# Patient Record
Sex: FEMALE | Race: WHITE | Employment: FULL TIME | URBAN - NONMETROPOLITAN AREA
[De-identification: names, ages, dates, MRNs, and addresses within clinical notes are randomized per-mention and may not be internally consistent; named-entity substitution may affect disease eponyms.]

---

## 2017-03-09 ENCOUNTER — TELEPHONE (OUTPATIENT)
Dept: FAMILY MEDICINE CLINIC | Age: 27
End: 2017-03-09

## 2017-03-09 DIAGNOSIS — L30.9 ECZEMA, UNSPECIFIED TYPE: Primary | ICD-10-CM

## 2017-03-09 RX ORDER — METHYLPREDNISOLONE 4 MG/1
TABLET ORAL
Qty: 1 KIT | Refills: 0 | Status: SHIPPED | OUTPATIENT
Start: 2017-03-09 | End: 2017-03-15

## 2017-04-21 ENCOUNTER — OFFICE VISIT (OUTPATIENT)
Dept: PSYCHIATRY | Age: 27
End: 2017-04-21

## 2017-04-21 VITALS
SYSTOLIC BLOOD PRESSURE: 149 MMHG | WEIGHT: 161 LBS | BODY MASS INDEX: 24.4 KG/M2 | HEIGHT: 68 IN | DIASTOLIC BLOOD PRESSURE: 89 MMHG | HEART RATE: 67 BPM

## 2017-04-21 DIAGNOSIS — F33.42 RECURRENT MAJOR DEPRESSIVE DISORDER, IN FULL REMISSION (HCC): Primary | ICD-10-CM

## 2017-04-21 DIAGNOSIS — F90.0 ATTENTION DEFICIT HYPERACTIVITY DISORDER (ADHD), PREDOMINANTLY INATTENTIVE TYPE: ICD-10-CM

## 2017-04-21 PROCEDURE — 90792 PSYCH DIAG EVAL W/MED SRVCS: CPT | Performed by: NURSE PRACTITIONER

## 2017-04-21 ASSESSMENT — ANXIETY QUESTIONNAIRES
1. FEELING NERVOUS, ANXIOUS, OR ON EDGE: 0-NOT AT ALL SURE
6. BECOMING EASILY ANNOYED OR IRRITABLE: 0-NOT AT ALL SURE
GAD7 TOTAL SCORE: 1
4. TROUBLE RELAXING: 0-NOT AT ALL SURE
2. NOT BEING ABLE TO STOP OR CONTROL WORRYING: 0-NOT AT ALL SURE
5. BEING SO RESTLESS THAT IT IS HARD TO SIT STILL: 1-SEVERAL DAYS
3. WORRYING TOO MUCH ABOUT DIFFERENT THINGS: 0-NOT AT ALL SURE
7. FEELING AFRAID AS IF SOMETHING AWFUL MIGHT HAPPEN: 0-NOT AT ALL SURE

## 2017-04-21 ASSESSMENT — PATIENT HEALTH QUESTIONNAIRE - PHQ9
10. IF YOU CHECKED OFF ANY PROBLEMS, HOW DIFFICULT HAVE THESE PROBLEMS MADE IT FOR YOU TO DO YOUR WORK, TAKE CARE OF THINGS AT HOME, OR GET ALONG WITH OTHER PEOPLE: 0
SUM OF ALL RESPONSES TO PHQ QUESTIONS 1-9: 0
7. TROUBLE CONCENTRATING ON THINGS, SUCH AS READING THE NEWSPAPER OR WATCHING TELEVISION: 0
5. POOR APPETITE OR OVEREATING: 0
2. FEELING DOWN, DEPRESSED OR HOPELESS: 0
8. MOVING OR SPEAKING SO SLOWLY THAT OTHER PEOPLE COULD HAVE NOTICED. OR THE OPPOSITE, BEING SO FIGETY OR RESTLESS THAT YOU HAVE BEEN MOVING AROUND A LOT MORE THAN USUAL: 0
6. FEELING BAD ABOUT YOURSELF - OR THAT YOU ARE A FAILURE OR HAVE LET YOURSELF OR YOUR FAMILY DOWN: 0
9. THOUGHTS THAT YOU WOULD BE BETTER OFF DEAD, OR OF HURTING YOURSELF: 0
SUM OF ALL RESPONSES TO PHQ9 QUESTIONS 1 & 2: 0
3. TROUBLE FALLING OR STAYING ASLEEP: 0
1. LITTLE INTEREST OR PLEASURE IN DOING THINGS: 0
4. FEELING TIRED OR HAVING LITTLE ENERGY: 0

## 2017-09-08 ENCOUNTER — OFFICE VISIT (OUTPATIENT)
Dept: FAMILY MEDICINE CLINIC | Age: 27
End: 2017-09-08

## 2017-09-08 VITALS
HEIGHT: 69 IN | BODY MASS INDEX: 23.25 KG/M2 | HEART RATE: 68 BPM | TEMPERATURE: 98.8 F | DIASTOLIC BLOOD PRESSURE: 68 MMHG | WEIGHT: 157 LBS | SYSTOLIC BLOOD PRESSURE: 132 MMHG | RESPIRATION RATE: 12 BRPM

## 2017-09-08 DIAGNOSIS — J30.1 SEASONAL ALLERGIC RHINITIS DUE TO POLLEN: ICD-10-CM

## 2017-09-08 DIAGNOSIS — F90.0 ATTENTION DEFICIT HYPERACTIVITY DISORDER (ADHD), PREDOMINANTLY INATTENTIVE TYPE: ICD-10-CM

## 2017-09-08 DIAGNOSIS — F41.1 GENERALIZED ANXIETY DISORDER: ICD-10-CM

## 2017-09-08 DIAGNOSIS — F33.42 RECURRENT MAJOR DEPRESSIVE DISORDER, IN FULL REMISSION (HCC): Primary | ICD-10-CM

## 2017-09-08 PROCEDURE — 99214 OFFICE O/P EST MOD 30 MIN: CPT | Performed by: NURSE PRACTITIONER

## 2017-09-08 RX ORDER — METHYLPHENIDATE HYDROCHLORIDE 36 MG/1
36 TABLET ORAL DAILY
Qty: 30 TABLET | Refills: 0 | Status: SHIPPED | OUTPATIENT
Start: 2017-09-08 | End: 2018-02-05 | Stop reason: ALTCHOICE

## 2017-09-08 RX ORDER — METHYLPHENIDATE HYDROCHLORIDE 36 MG/1
36 TABLET ORAL EVERY MORNING
Qty: 30 TABLET | Refills: 0 | Status: SHIPPED | OUTPATIENT
Start: 2017-09-08 | End: 2017-11-28 | Stop reason: SDUPTHER

## 2017-09-08 RX ORDER — FLUOXETINE HYDROCHLORIDE 40 MG/1
40 CAPSULE ORAL DAILY
Qty: 90 CAPSULE | Refills: 3 | Status: SHIPPED | OUTPATIENT
Start: 2017-09-08 | End: 2018-11-15 | Stop reason: SDUPTHER

## 2017-09-08 RX ORDER — FLUTICASONE PROPIONATE 50 MCG
2 SPRAY, SUSPENSION (ML) NASAL DAILY
Qty: 1 BOTTLE | Refills: 5 | Status: SHIPPED | OUTPATIENT
Start: 2017-09-08 | End: 2019-02-22 | Stop reason: ALTCHOICE

## 2017-09-15 ENCOUNTER — TELEPHONE (OUTPATIENT)
Dept: FAMILY MEDICINE CLINIC | Age: 27
End: 2017-09-15

## 2017-09-15 DIAGNOSIS — Z29.8 NEED FOR MALARIA PROPHYLAXIS: Primary | ICD-10-CM

## 2017-09-15 RX ORDER — ATOVAQUONE AND PROGUANIL HYDROCHLORIDE 250; 100 MG/1; MG/1
1 TABLET, FILM COATED ORAL DAILY
Qty: 16 TABLET | Refills: 0 | Status: SHIPPED | OUTPATIENT
Start: 2017-09-15 | End: 2018-01-02 | Stop reason: ALTCHOICE

## 2017-09-28 ENCOUNTER — NURSE ONLY (OUTPATIENT)
Dept: FAMILY MEDICINE CLINIC | Age: 27
End: 2017-09-28

## 2017-09-28 DIAGNOSIS — Z23 NEED FOR IMMUNIZATION AGAINST TYPHOID: Primary | ICD-10-CM

## 2017-09-28 PROCEDURE — 90471 IMMUNIZATION ADMIN: CPT | Performed by: NURSE PRACTITIONER

## 2017-09-28 PROCEDURE — 90691 TYPHOID VACCINE IM: CPT | Performed by: NURSE PRACTITIONER

## 2017-10-10 DIAGNOSIS — F90.0 ATTENTION DEFICIT HYPERACTIVITY DISORDER (ADHD), PREDOMINANTLY INATTENTIVE TYPE: Primary | ICD-10-CM

## 2017-10-10 RX ORDER — METHYLPHENIDATE HYDROCHLORIDE 36 MG/1
TABLET ORAL
Qty: 30 TABLET | Refills: 0 | Status: SHIPPED | OUTPATIENT
Start: 2017-10-10 | End: 2018-02-05 | Stop reason: ALTCHOICE

## 2017-10-10 NOTE — TELEPHONE ENCOUNTER
ASSESSMENT & PLAN  1. Attention deficit hyperactivity disorder (ADHD), predominantly inattentive type    - methylphenidate (CONCERTA) 36 MG extended release tablet; TAKE ONE TABLET BY MOUTH EVERY MORNING. Dispense: 30 tablet; Refill: 0      OAARS reviewed and appropriate. Controlled Substances Monitoring: Attestation: The Prescription Monitoring Report for this patient was reviewed today. Manish Ochoa DO)  Documentation: No signs of potential drug abuse or diversion identified. Manish Ochoa, DO)      No future appointments.

## 2017-11-28 DIAGNOSIS — F90.0 ATTENTION DEFICIT HYPERACTIVITY DISORDER (ADHD), PREDOMINANTLY INATTENTIVE TYPE: ICD-10-CM

## 2017-11-28 RX ORDER — METHYLPHENIDATE HYDROCHLORIDE 36 MG/1
36 TABLET ORAL EVERY MORNING
Qty: 30 TABLET | Refills: 0 | Status: SHIPPED | OUTPATIENT
Start: 2017-11-28 | End: 2018-01-12 | Stop reason: SDUPTHER

## 2018-01-02 ENCOUNTER — OFFICE VISIT (OUTPATIENT)
Dept: FAMILY MEDICINE CLINIC | Age: 28
End: 2018-01-02
Payer: COMMERCIAL

## 2018-01-02 VITALS
DIASTOLIC BLOOD PRESSURE: 64 MMHG | TEMPERATURE: 98.8 F | RESPIRATION RATE: 12 BRPM | HEIGHT: 69 IN | BODY MASS INDEX: 23.25 KG/M2 | WEIGHT: 157 LBS | HEART RATE: 80 BPM | SYSTOLIC BLOOD PRESSURE: 124 MMHG

## 2018-01-02 DIAGNOSIS — S29.012A STRAIN OF RHOMBOID MUSCLE, INITIAL ENCOUNTER: Primary | ICD-10-CM

## 2018-01-02 PROCEDURE — 99213 OFFICE O/P EST LOW 20 MIN: CPT | Performed by: NURSE PRACTITIONER

## 2018-01-02 RX ORDER — METHYLPREDNISOLONE 4 MG/1
TABLET ORAL
Qty: 1 KIT | Refills: 0 | Status: SHIPPED | OUTPATIENT
Start: 2018-01-02 | End: 2018-01-08

## 2018-01-02 NOTE — PROGRESS NOTES
SUBJECTIVE:  Isis Miles is a 33 y.o. y/o female that presents with Back Pain (upper - started sat)    HPI:  Symptoms have been present for 2 day(s). she describes the pain as sharp, burning  in the cervical, thoracic region. Inciting injury or history of trauma? Yes - was doing shoulder press  Pain is relieved by - ibuprofen, ice  Pain is aggravated by - turning, certain positions  Radiation of the pain? Yes, up her neck and down the left side of her back  Paresthesias of the extremities? No  Saddle anesthesia? No  Bowel or bladder incontinence? No  Treatments tried - ibuprofen 800 mg, zanaflex, currently does feel like the pain is getting slowly better. The Zanaflex doesn't help, just makes her very tired, and it also made her nauseated. OBJECTIVE:  /64   Pulse 80   Temp 98.8 °F (37.1 °C) (Oral)   Resp 12   Ht 5' 9\" (1.753 m)   Wt 157 lb (71.2 kg)   BMI 23.18 kg/m²   Physical Examination: General appearance - alert, well appearing, and in no distress  Chest - clear to auscultation, no wheezes, rales or rhonchi, symmetric air entry  Heart - normal rate, regular rhythm, normal S1, S2, no murmurs, rubs, clicks or gallops  Back exam - limited range of motion, pain with motion noted during exam, tenderness noted left cervical paraspinous muscles, + cervical muscle spasm, pain and mild swelling left rhomboid muscle with + spasm, sensory exam intact bilateral lower extremities,  5/5 strength globally and symmetrically in the LEs  Extremities - peripheral pulses normal, no pedal edema, no clubbing or cyanosis  Skin -  Skin color, texture, turgor normal. No rashes or lesions. Psych - Affect normal, no evidence of depression or anxiety    ASSESSMENT & Nbaadonis Huynh was seen today for back pain. Diagnoses and all orders for this visit:    Strain of rhomboid muscle, initial encounter  -     methylPREDNISolone (MEDROL DOSEPACK) 4 MG tablet; Take by mouth as directed.     continue Motrin and

## 2018-01-12 DIAGNOSIS — F90.0 ATTENTION DEFICIT HYPERACTIVITY DISORDER (ADHD), PREDOMINANTLY INATTENTIVE TYPE: ICD-10-CM

## 2018-01-12 RX ORDER — METHYLPHENIDATE HYDROCHLORIDE 36 MG/1
TABLET ORAL
Qty: 30 TABLET | Refills: 0 | Status: SHIPPED | OUTPATIENT
Start: 2018-01-12 | End: 2018-02-05 | Stop reason: ALTCHOICE

## 2018-02-05 ENCOUNTER — TELEPHONE (OUTPATIENT)
Dept: FAMILY MEDICINE CLINIC | Age: 28
End: 2018-02-05

## 2018-02-05 DIAGNOSIS — F90.0 ATTENTION DEFICIT HYPERACTIVITY DISORDER (ADHD), PREDOMINANTLY INATTENTIVE TYPE: Primary | ICD-10-CM

## 2018-02-05 RX ORDER — METHYLPHENIDATE HYDROCHLORIDE 54 MG/1
54 TABLET ORAL EVERY MORNING
Qty: 30 TABLET | Refills: 0 | Status: SHIPPED | OUTPATIENT
Start: 2018-02-05 | End: 2018-03-05 | Stop reason: SDUPTHER

## 2018-02-05 NOTE — TELEPHONE ENCOUNTER
Started PA with Mini from [employer] for Concerta 54 mg daily. Mini states we should receive a fax for Approval/Denial in 24 hrs. If we have not heard back from them by 4:30 tomorrow 2/6/18, we should call the [employer] back to ask for verdict. For the PA pt has previously tried and failed Adderal ER, Ritalin ER and has been on Concerta 36 mg since age 13 yr.

## 2018-02-26 ENCOUNTER — TELEPHONE (OUTPATIENT)
Dept: FAMILY MEDICINE CLINIC | Age: 28
End: 2018-02-26

## 2018-02-26 DIAGNOSIS — J40 BRONCHITIS: Primary | ICD-10-CM

## 2018-02-26 RX ORDER — ALBUTEROL SULFATE 90 UG/1
2 AEROSOL, METERED RESPIRATORY (INHALATION) EVERY 6 HOURS PRN
Qty: 1 INHALER | Refills: 3 | Status: SHIPPED | OUTPATIENT
Start: 2018-02-26

## 2018-02-26 RX ORDER — BENZONATATE 100 MG/1
100 CAPSULE ORAL 3 TIMES DAILY PRN
Qty: 30 CAPSULE | Refills: 0 | Status: SHIPPED | OUTPATIENT
Start: 2018-02-26 | End: 2018-03-05

## 2018-03-05 DIAGNOSIS — F90.0 ATTENTION DEFICIT HYPERACTIVITY DISORDER (ADHD), PREDOMINANTLY INATTENTIVE TYPE: ICD-10-CM

## 2018-03-05 RX ORDER — METHYLPHENIDATE HYDROCHLORIDE 54 MG/1
TABLET ORAL
Qty: 30 TABLET | Refills: 0 | Status: SHIPPED | OUTPATIENT
Start: 2018-03-05 | End: 2018-03-08 | Stop reason: SDUPTHER

## 2018-03-08 ENCOUNTER — OFFICE VISIT (OUTPATIENT)
Dept: FAMILY MEDICINE CLINIC | Age: 28
End: 2018-03-08
Payer: COMMERCIAL

## 2018-03-08 VITALS
HEART RATE: 58 BPM | DIASTOLIC BLOOD PRESSURE: 70 MMHG | TEMPERATURE: 98.3 F | WEIGHT: 165 LBS | BODY MASS INDEX: 25.01 KG/M2 | HEIGHT: 68 IN | SYSTOLIC BLOOD PRESSURE: 94 MMHG | RESPIRATION RATE: 14 BRPM

## 2018-03-08 DIAGNOSIS — F90.0 ATTENTION DEFICIT HYPERACTIVITY DISORDER (ADHD), PREDOMINANTLY INATTENTIVE TYPE: ICD-10-CM

## 2018-03-08 DIAGNOSIS — D50.8 IRON DEFICIENCY ANEMIA DUE TO DIETARY CAUSES: Primary | ICD-10-CM

## 2018-03-08 PROCEDURE — 99213 OFFICE O/P EST LOW 20 MIN: CPT | Performed by: NURSE PRACTITIONER

## 2018-03-08 RX ORDER — METHYLPHENIDATE HYDROCHLORIDE 54 MG/1
TABLET ORAL
Qty: 30 TABLET | Refills: 0 | Status: SHIPPED | OUTPATIENT
Start: 2018-03-08 | End: 2018-06-25 | Stop reason: SDUPTHER

## 2018-03-16 ENCOUNTER — TELEPHONE (OUTPATIENT)
Dept: FAMILY MEDICINE CLINIC | Age: 28
End: 2018-03-16

## 2018-03-16 NOTE — TELEPHONE ENCOUNTER
1st attempt to contact the pt re:overdue labs that [facility] ordered on 3/8/18. Pt states she will get them done.

## 2018-04-07 ENCOUNTER — HOSPITAL ENCOUNTER (OUTPATIENT)
Age: 28
Discharge: HOME | End: 2018-04-07
Payer: COMMERCIAL

## 2018-04-07 LAB
BASOPHILS # BLD: 1.3 %
BASOPHILS ABSOLUTE: 0.1 THOU/MM3 (ref 0–0.1)
EOSINOPHIL # BLD: 2.7 %
EOSINOPHILS ABSOLUTE: 0.1 THOU/MM3 (ref 0–0.4)
FERRITIN: 46 NG/ML (ref 10–291)
HCT VFR BLD CALC: 40.4 % (ref 37–47)
HEMOGLOBIN: 13.5 GM/DL (ref 12–16)
IRON SATURATION: 23 % (ref 20–50)
IRON: 74 UG/DL (ref 50–170)
LYMPHOCYTES # BLD: 33.6 %
LYMPHOCYTES ABSOLUTE: 1.4 THOU/MM3 (ref 1–4.8)
MCH RBC QN AUTO: 32.2 PG (ref 27–31)
MCHC RBC AUTO-ENTMCNC: 33.4 GM/DL (ref 33–37)
MCV RBC AUTO: 96.5 FL (ref 81–99)
MONOCYTES # BLD: 7.9 %
MONOCYTES ABSOLUTE: 0.3 THOU/MM3 (ref 0.4–1.3)
NUCLEATED RED BLOOD CELLS: 0 /100 WBC
PDW BLD-RTO: 12.3 % (ref 11.5–14.5)
PLATELET # BLD: 193 THOU/MM3 (ref 130–400)
PMV BLD AUTO: 12 FL (ref 7.4–10.4)
RBC # BLD: 4.18 MILL/MM3 (ref 4.2–5.4)
SEG NEUTROPHILS: 54.5 %
SEGMENTED NEUTROPHILS ABSOLUTE COUNT: 2.3 THOU/MM3 (ref 1.8–7.7)
TOTAL IRON BINDING CAPACITY: 328 UG/DL (ref 171–450)
WBC # BLD: 4.3 THOU/MM3 (ref 4.8–10.8)

## 2018-04-07 PROCEDURE — 36415 COLL VENOUS BLD VENIPUNCTURE: CPT

## 2018-04-07 PROCEDURE — 82728 ASSAY OF FERRITIN: CPT

## 2018-04-07 PROCEDURE — 83540 ASSAY OF IRON: CPT

## 2018-04-07 PROCEDURE — 85025 COMPLETE CBC W/AUTO DIFF WBC: CPT

## 2018-04-07 PROCEDURE — 83550 IRON BINDING TEST: CPT

## 2018-04-17 DIAGNOSIS — F90.0 ATTENTION DEFICIT HYPERACTIVITY DISORDER (ADHD), PREDOMINANTLY INATTENTIVE TYPE: ICD-10-CM

## 2018-04-17 RX ORDER — METHYLPHENIDATE HYDROCHLORIDE 54 MG/1
TABLET ORAL
Qty: 30 TABLET | Refills: 0 | Status: SHIPPED | OUTPATIENT
Start: 2018-04-17 | End: 2018-10-23 | Stop reason: SDUPTHER

## 2018-05-21 ENCOUNTER — TELEPHONE (OUTPATIENT)
Dept: FAMILY MEDICINE CLINIC | Age: 28
End: 2018-05-21

## 2018-05-21 ENCOUNTER — NURSE ONLY (OUTPATIENT)
Dept: FAMILY MEDICINE CLINIC | Age: 28
End: 2018-05-21
Payer: COMMERCIAL

## 2018-05-21 DIAGNOSIS — Z23 NEED FOR HEPATITIS A IMMUNIZATION: Primary | ICD-10-CM

## 2018-05-21 PROCEDURE — 90471 IMMUNIZATION ADMIN: CPT | Performed by: NURSE PRACTITIONER

## 2018-05-21 PROCEDURE — 90633 HEPA VACC PED/ADOL 2 DOSE IM: CPT | Performed by: NURSE PRACTITIONER

## 2018-06-25 DIAGNOSIS — F90.0 ATTENTION DEFICIT HYPERACTIVITY DISORDER (ADHD), PREDOMINANTLY INATTENTIVE TYPE: ICD-10-CM

## 2018-06-25 RX ORDER — METHYLPHENIDATE HYDROCHLORIDE 54 MG/1
TABLET ORAL
Qty: 30 TABLET | Refills: 0 | Status: SHIPPED | OUTPATIENT
Start: 2018-06-25 | End: 2018-07-24 | Stop reason: SDUPTHER

## 2018-07-05 ENCOUNTER — TELEPHONE (OUTPATIENT)
Dept: FAMILY MEDICINE CLINIC | Age: 28
End: 2018-07-05

## 2018-07-05 DIAGNOSIS — Z29.8 NEED FOR MALARIA PROPHYLAXIS: ICD-10-CM

## 2018-07-05 RX ORDER — ATOVAQUONE AND PROGUANIL HYDROCHLORIDE 250; 100 MG/1; MG/1
1 TABLET, FILM COATED ORAL DAILY
Qty: 16 TABLET | Refills: 0 | Status: SHIPPED | OUTPATIENT
Start: 2018-07-05 | End: 2018-09-24 | Stop reason: SDUPTHER

## 2018-07-05 NOTE — TELEPHONE ENCOUNTER
Pt going on trip to [facility] next week. Requesting script for Malaria medication (Malarone) sent to [facility].

## 2018-07-24 DIAGNOSIS — F90.0 ATTENTION DEFICIT HYPERACTIVITY DISORDER (ADHD), PREDOMINANTLY INATTENTIVE TYPE: ICD-10-CM

## 2018-07-24 RX ORDER — METHYLPHENIDATE HYDROCHLORIDE 54 MG/1
TABLET ORAL
Qty: 30 TABLET | Refills: 0 | Status: SHIPPED | OUTPATIENT
Start: 2018-07-24 | End: 2018-08-23 | Stop reason: SDUPTHER

## 2018-08-23 ENCOUNTER — OFFICE VISIT (OUTPATIENT)
Dept: FAMILY MEDICINE CLINIC | Age: 28
End: 2018-08-23
Payer: COMMERCIAL

## 2018-08-23 VITALS
HEIGHT: 70 IN | BODY MASS INDEX: 24.2 KG/M2 | TEMPERATURE: 98.3 F | RESPIRATION RATE: 16 BRPM | HEART RATE: 72 BPM | DIASTOLIC BLOOD PRESSURE: 78 MMHG | WEIGHT: 169 LBS | SYSTOLIC BLOOD PRESSURE: 138 MMHG

## 2018-08-23 DIAGNOSIS — F41.1 GENERALIZED ANXIETY DISORDER: Primary | ICD-10-CM

## 2018-08-23 DIAGNOSIS — F90.0 ATTENTION DEFICIT HYPERACTIVITY DISORDER (ADHD), PREDOMINANTLY INATTENTIVE TYPE: ICD-10-CM

## 2018-08-23 PROCEDURE — 99214 OFFICE O/P EST MOD 30 MIN: CPT | Performed by: NURSE PRACTITIONER

## 2018-08-23 RX ORDER — BUSPIRONE HYDROCHLORIDE 10 MG/1
10 TABLET ORAL 3 TIMES DAILY
Qty: 90 TABLET | Refills: 2 | Status: SHIPPED | OUTPATIENT
Start: 2018-08-23

## 2018-08-23 RX ORDER — METHYLPHENIDATE HYDROCHLORIDE 54 MG/1
TABLET ORAL
Qty: 30 TABLET | Refills: 0 | Status: SHIPPED | OUTPATIENT
Start: 2018-08-23 | End: 2018-12-12 | Stop reason: SDUPTHER

## 2018-08-23 NOTE — PROGRESS NOTES
Have you changed or started any medications since your last visit including any over-the-counter medicines, vitamins, or herbal medicines? no   Are you having any side effects from any of your medications? -  no  Have you stopped taking any of your medications? Is so, why? -  no    Have you seen any other physician or provider since your last visit? No  Have you had any other diagnostic tests since your last visit? No  Have you been seen in the emergency room and/or had an admission to a hospital since we last saw you? No  Have you had your routine dental cleaning in the past 6 months? no    Have you activated your [employer] account? If not, what are your barriers? Yes     Patient Care Team:  SAMUEL Lala CNP as PCP - General (Family Medicine)  SAMUEL Lala CNP as PCP - S Attributed Provider  MD Marcus Richmond MD Johnye Captain, MD    Medical History Review  Past Medical, Family, and Social History     Defer to provider.

## 2018-09-18 ENCOUNTER — OFFICE VISIT (OUTPATIENT)
Dept: FAMILY MEDICINE CLINIC | Age: 28
End: 2018-09-18

## 2018-09-18 VITALS
DIASTOLIC BLOOD PRESSURE: 78 MMHG | HEIGHT: 70 IN | RESPIRATION RATE: 16 BRPM | BODY MASS INDEX: 23.54 KG/M2 | TEMPERATURE: 98.8 F | SYSTOLIC BLOOD PRESSURE: 132 MMHG | HEART RATE: 100 BPM | WEIGHT: 164.4 LBS

## 2018-09-18 DIAGNOSIS — S46.912A STRAIN OF LEFT SHOULDER, INITIAL ENCOUNTER: ICD-10-CM

## 2018-09-18 DIAGNOSIS — S13.4XXA WHIPLASH INJURY TO NECK, INITIAL ENCOUNTER: Primary | ICD-10-CM

## 2018-09-18 DIAGNOSIS — T14.8XXA CONTUSION OF LEFT CLAVICLE, INITIAL ENCOUNTER: ICD-10-CM
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
## 2018-09-18 PROCEDURE — 99214 OFFICE O/P EST MOD 30 MIN: CPT | Performed by: NURSE PRACTITIONER

## 2018-09-18 RX ORDER — METHYLPREDNISOLONE 4 MG/1
TABLET ORAL
Qty: 1 KIT | Refills: 0 | Status: SHIPPED | OUTPATIENT
Start: 2018-09-18 | End: 2018-09-24

## 2018-09-18 RX ORDER — ATOVAQUONE AND PROGUANIL HYDROCHLORIDE 250; 100 MG/1; MG/1
1 TABLET, FILM COATED ORAL DAILY
Qty: 16 TABLET | Refills: 0 | Status: CANCELLED | OUTPATIENT
Start: 2018-09-18

## 2018-09-18 ASSESSMENT — PATIENT HEALTH QUESTIONNAIRE - PHQ9
SUM OF ALL RESPONSES TO PHQ9 QUESTIONS 1 & 2: 0
SUM OF ALL RESPONSES TO PHQ QUESTIONS 1-9: 0
2. FEELING DOWN, DEPRESSED OR HOPELESS: 0
1. LITTLE INTEREST OR PLEASURE IN DOING THINGS: 0
SUM OF ALL RESPONSES TO PHQ QUESTIONS 1-9: 0

## 2018-09-18 NOTE — PROGRESS NOTES
pain, Change in frequency, Urgency  Skin:  Color change, Rash, Itching, Wound  Psychiatric:  Hallucinations, Anxiety, Depression, Suicidal ideation  Hematological:  Enlarged glands, Easy bleeding, Easily bruising  Musculoskeletal:  Joint pain, Back pain, Gait problems, Joint swelling, Myalgias  Neurological:  Dizziness, Headaches, Presyncope, Numbness, Seizures, Tremors  Allergy:  Environmental allergies, Food allergies  Endocrine:  Heat Intolerance, Cold Intolerance, Polydipsia, Polyphagia, Polyuria      PHYSICAL EXAM:  Vitals:    09/18/18 1448   BP: 132/78   Pulse: 100   Resp: 16   Temp: 98.8 °F (37.1 °C)   TempSrc: Oral   Weight: 164 lb 6.4 oz (74.6 kg)   Height: 5' 9.5\" (1.765 m)     Body mass index is 23.93 kg/m². VS Reviewed  General Appearance: A&O x 3, No acute distress,well developed and well- nourished  Head: normocephalic and atraumatic  Eyes: pupils equal, round, and reactive to light, extraocular eye movements intact, conjunctivae and eye lids without erythema  Neck: supple without mass, no thyromegaly or thyroid nodules, left sided cervical muscle spasm and pain, full ROM of neck  Pulmonary/Chest: clear to auscultation bilaterally- no wheezes, rales or rhonchi, normal air movement, no respiratory distress or retractions  Cardiovascular: S1 and S2 auscultated w/ RRR. No murmurs, rubs, clicks, or gallops, distal pulses intact. Abdomen: soft, non-tender, non-distended, bowl sounds physiologic,  no rebound or guarding, no masses or hernias noted. Liver and spleen without enlargement. Extremities: no cyanosis, clubbing or edema of the lower extremities  Musculoskeletal: No joint swelling or gross deformity  Left shoulder/clavicle: pain of left clavicle with palpation, pain left shoulder anterior aspect with palpation, full ROM of left arm and shoulder without pain, strength 5/5   Neuro:  Alert, 5/5 strength globally and symmetrically  Psych: Affect and mood are tearful.  Thought process is normal

## 2018-09-19 ENCOUNTER — HOSPITAL ENCOUNTER (OUTPATIENT)
Age: 28
Discharge: HOME | End: 2018-09-19
Payer: COMMERCIAL

## 2018-09-19 ENCOUNTER — HOSPITAL ENCOUNTER (OUTPATIENT)
Dept: GENERAL RADIOLOGY | Age: 28
Discharge: HOME | End: 2018-09-19
Payer: COMMERCIAL

## 2018-09-19 DIAGNOSIS — S46.912A STRAIN OF LEFT SHOULDER, INITIAL ENCOUNTER: ICD-10-CM
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
## 2018-09-19 DIAGNOSIS — T14.8XXA CONTUSION OF LEFT CLAVICLE, INITIAL ENCOUNTER: ICD-10-CM

## 2018-09-19 PROCEDURE — 73030 X-RAY EXAM OF SHOULDER: CPT

## 2018-09-24 DIAGNOSIS — Z29.8 NEED FOR MALARIA PROPHYLAXIS: ICD-10-CM

## 2018-09-24 RX ORDER — ATOVAQUONE AND PROGUANIL HYDROCHLORIDE 250; 100 MG/1; MG/1
1 TABLET, FILM COATED ORAL DAILY
Qty: 16 TABLET | Refills: 0 | Status: SHIPPED | OUTPATIENT
Start: 2018-09-24 | End: 2019-01-03 | Stop reason: SDUPTHER

## 2018-10-11 ENCOUNTER — OFFICE VISIT (OUTPATIENT)
Dept: BEHAVIORAL/MENTAL HEALTH CLINIC | Age: 28
End: 2018-10-11
Payer: COMMERCIAL

## 2018-10-11 DIAGNOSIS — F41.9 ANXIETY DISORDER, UNSPECIFIED TYPE: Primary | ICD-10-CM

## 2018-10-11 DIAGNOSIS — F41.0 PANIC ATTACKS: ICD-10-CM

## 2018-10-11 PROCEDURE — 90791 PSYCH DIAGNOSTIC EVALUATION: CPT | Performed by: PSYCHOLOGIST

## 2018-10-11 NOTE — PROGRESS NOTES
started smoking about 7 years ago. She smoked 0.50 packs per day. She has never used smokeless tobacco.  ETOH:   reports that she drinks alcohol. Family History:   Family History   Problem Relation Age of Onset    Negative None     Breast Cancer None     Uterine Cancer None     Colon Cancer Paternal Grandfather     Alzheimer's Disease Paternal Grandfather     Ovarian Cancer None     Mental Illness Mother         fibromyalgia and MDD    Mental Illness Father         MDD    Asthma Father     COPD Father     Thyroid Disease Father         hypothyroidism    Asthma Paternal Grandmother     Atrial Fibrillation Paternal Grandmother     Cancer Paternal Grandmother         skin           A:  Pt has the knowledge and skills to manage anxiety and distress better. She simply got out of the practice, but the panic attack seems to have been a \"wake up call\" to reinforce how she needs to get back to those maintenance habits that worked in the past.    Diagnosis:    Adjustment disorder with anxiety   Panic attack      Diagnosis Date    Anorexia     Attention deficit hyperactivity disorder (ADHD), predominantly inattentive type 9/8/2017    History of asthma     History of depression     Polycystic ovarian syndrome      Other psychosocial and environmental problems      Plan:  Pt interventions:  Provided education, Discussed self-care (sleep, nutrition, rewarding activities, social support, exercise), Discussed and problem-solved barriers in adhering to behavioral change plan, Motivational Interviewing to increase patient confidence and compliance with adhering to behavioral change plan, Motivational Interviewing to determine importance and readiness for change, Discussed potential barriers to change and [place]-setting to identify pt's primary goals for [facility] visit / overall health      Pt Behavioral Change Plan:  1.   Building in some daily routines everyday may just be what you need to manage things

## 2018-10-23 DIAGNOSIS — F90.0 ATTENTION DEFICIT HYPERACTIVITY DISORDER (ADHD), PREDOMINANTLY INATTENTIVE TYPE: ICD-10-CM

## 2018-10-23 RX ORDER — METHYLPHENIDATE HYDROCHLORIDE 54 MG/1
TABLET ORAL
Qty: 30 TABLET | Refills: 0 | Status: SHIPPED | OUTPATIENT
Start: 2018-10-23 | End: 2019-02-22 | Stop reason: SDUPTHER

## 2018-11-15 DIAGNOSIS — F41.1 GENERALIZED ANXIETY DISORDER: ICD-10-CM

## 2018-11-15 DIAGNOSIS — F33.42 RECURRENT MAJOR DEPRESSIVE DISORDER, IN FULL REMISSION (HCC): ICD-10-CM

## 2018-11-15 RX ORDER — FLUOXETINE HYDROCHLORIDE 40 MG/1
40 CAPSULE ORAL DAILY
Qty: 90 CAPSULE | Refills: 3 | Status: SHIPPED | OUTPATIENT
Start: 2018-11-15 | End: 2019-02-22 | Stop reason: SDUPTHER

## 2018-11-16 ENCOUNTER — TELEPHONE (OUTPATIENT)
Dept: FAMILY MEDICINE CLINIC | Age: 28
End: 2018-11-16

## 2018-11-16 DIAGNOSIS — J30.1 SEASONAL ALLERGIC RHINITIS DUE TO POLLEN: Primary | ICD-10-CM

## 2018-11-16 RX ORDER — LEVOCETIRIZINE DIHYDROCHLORIDE 5 MG/1
5 TABLET, FILM COATED ORAL DAILY
Qty: 90 TABLET | Refills: 1 | Status: SHIPPED | OUTPATIENT
Start: 2018-11-16

## 2018-12-12 DIAGNOSIS — F90.0 ATTENTION DEFICIT HYPERACTIVITY DISORDER (ADHD), PREDOMINANTLY INATTENTIVE TYPE: ICD-10-CM

## 2018-12-12 RX ORDER — METHYLPHENIDATE HYDROCHLORIDE 54 MG/1
TABLET ORAL
Qty: 30 TABLET | Refills: 0 | Status: SHIPPED | OUTPATIENT
Start: 2018-12-12 | End: 2019-02-22 | Stop reason: SDUPTHER

## 2019-01-03 ENCOUNTER — NURSE ONLY (OUTPATIENT)
Dept: FAMILY MEDICINE CLINIC | Age: 29
End: 2019-01-03
Payer: COMMERCIAL

## 2019-01-03 DIAGNOSIS — Z23 NEED FOR HEPATITIS A IMMUNIZATION: Primary | ICD-10-CM

## 2019-01-03 DIAGNOSIS — Z29.8 NEED FOR MALARIA PROPHYLAXIS: ICD-10-CM

## 2019-01-03 PROCEDURE — 90632 HEPA VACCINE ADULT IM: CPT | Performed by: NURSE PRACTITIONER

## 2019-01-03 PROCEDURE — 90471 IMMUNIZATION ADMIN: CPT | Performed by: NURSE PRACTITIONER

## 2019-01-03 RX ORDER — ATOVAQUONE AND PROGUANIL HYDROCHLORIDE 250; 100 MG/1; MG/1
1 TABLET, FILM COATED ORAL DAILY
Qty: 16 TABLET | Refills: 0 | Status: SHIPPED | OUTPATIENT
Start: 2019-01-03 | End: 2019-01-04 | Stop reason: SDUPTHER

## 2019-01-04 DIAGNOSIS — Z29.8 NEED FOR MALARIA PROPHYLAXIS: ICD-10-CM

## 2019-01-04 RX ORDER — ATOVAQUONE AND PROGUANIL HYDROCHLORIDE 250; 100 MG/1; MG/1
1 TABLET, FILM COATED ORAL DAILY
Qty: 16 TABLET | Refills: 0 | Status: SHIPPED | OUTPATIENT
Start: 2019-01-04 | End: 2019-02-22 | Stop reason: ALTCHOICE

## 2019-02-22 ENCOUNTER — OFFICE VISIT (OUTPATIENT)
Dept: FAMILY MEDICINE CLINIC | Age: 29
End: 2019-02-22
Payer: COMMERCIAL

## 2019-02-22 VITALS
HEART RATE: 80 BPM | HEIGHT: 70 IN | DIASTOLIC BLOOD PRESSURE: 76 MMHG | WEIGHT: 164.6 LBS | TEMPERATURE: 98.7 F | RESPIRATION RATE: 14 BRPM | SYSTOLIC BLOOD PRESSURE: 100 MMHG | BODY MASS INDEX: 23.56 KG/M2

## 2019-02-22 DIAGNOSIS — F41.1 GENERALIZED ANXIETY DISORDER: Primary | ICD-10-CM

## 2019-02-22 DIAGNOSIS — F90.0 ATTENTION DEFICIT HYPERACTIVITY DISORDER (ADHD), PREDOMINANTLY INATTENTIVE TYPE: ICD-10-CM

## 2019-02-22 PROCEDURE — 99214 OFFICE O/P EST MOD 30 MIN: CPT | Performed by: NURSE PRACTITIONER

## 2019-02-22 RX ORDER — METHYLPHENIDATE HYDROCHLORIDE 54 MG/1
TABLET ORAL
Qty: 30 TABLET | Refills: 0 | Status: SHIPPED | OUTPATIENT
Start: 2019-02-22 | End: 2019-06-24

## 2019-05-28 RX ORDER — FLUOXETINE HYDROCHLORIDE 40 MG/1
40 CAPSULE ORAL DAILY
Qty: 90 CAPSULE | Refills: 0 | Status: SHIPPED | OUTPATIENT
Start: 2019-05-28